# Patient Record
Sex: MALE | Race: WHITE | NOT HISPANIC OR LATINO | Employment: UNEMPLOYED | URBAN - METROPOLITAN AREA
[De-identification: names, ages, dates, MRNs, and addresses within clinical notes are randomized per-mention and may not be internally consistent; named-entity substitution may affect disease eponyms.]

---
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
## 2025-06-09 ENCOUNTER — RESULTS FOLLOW-UP (OUTPATIENT)
Dept: URGENT CARE | Facility: CLINIC | Age: 20
End: 2025-06-09

## 2025-06-09 ENCOUNTER — OCCMED (OUTPATIENT)
Dept: URGENT CARE | Facility: CLINIC | Age: 20
End: 2025-06-09
Payer: OTHER MISCELLANEOUS

## 2025-06-09 ENCOUNTER — APPOINTMENT (OUTPATIENT)
Dept: RADIOLOGY | Facility: CLINIC | Age: 20
End: 2025-06-09
Attending: PHYSICIAN ASSISTANT
Payer: OTHER MISCELLANEOUS

## 2025-06-09 DIAGNOSIS — S63.635A SPRAIN OF INTERPHALANGEAL JOINT OF LEFT RING FINGER, INITIAL ENCOUNTER: Primary | ICD-10-CM
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
## 2025-06-09 DIAGNOSIS — S63.635A SPRAIN OF INTERPHALANGEAL JOINT OF LEFT RING FINGER, INITIAL ENCOUNTER: ICD-10-CM

## 2025-06-09 PROCEDURE — 73140 X-RAY EXAM OF FINGER(S): CPT

## 2025-06-09 PROCEDURE — 99284 EMERGENCY DEPT VISIT MOD MDM: CPT | Performed by: PHYSICIAN ASSISTANT

## 2025-06-09 PROCEDURE — G0383 LEV 4 HOSP TYPE B ED VISIT: HCPCS | Performed by: PHYSICIAN ASSISTANT
